# Patient Record
Sex: FEMALE | Race: WHITE | Employment: OTHER
[De-identification: names, ages, dates, MRNs, and addresses within clinical notes are randomized per-mention and may not be internally consistent; named-entity substitution may affect disease eponyms.]

---

## 2017-02-03 PROCEDURE — 87340 HEPATITIS B SURFACE AG IA: CPT | Performed by: INTERNAL MEDICINE

## 2017-02-03 PROCEDURE — 83516 IMMUNOASSAY NONANTIBODY: CPT | Performed by: INTERNAL MEDICINE

## 2017-02-03 PROCEDURE — 86803 HEPATITIS C AB TEST: CPT | Performed by: INTERNAL MEDICINE

## 2017-02-03 PROCEDURE — 84432 ASSAY OF THYROGLOBULIN: CPT | Performed by: INTERNAL MEDICINE

## 2017-03-22 ENCOUNTER — SURGERY (OUTPATIENT)
Age: 67
End: 2017-03-22

## 2017-03-22 ENCOUNTER — HOSPITAL ENCOUNTER (OUTPATIENT)
Facility: HOSPITAL | Age: 67
Setting detail: HOSPITAL OUTPATIENT SURGERY
Discharge: HOME | End: 2017-03-22
Attending: INTERNAL MEDICINE | Admitting: INTERNAL MEDICINE
Payer: COMMERCIAL

## 2017-03-22 VITALS
RESPIRATION RATE: 16 BRPM | HEIGHT: 64 IN | WEIGHT: 145 LBS | TEMPERATURE: 98 F | HEART RATE: 80 BPM | SYSTOLIC BLOOD PRESSURE: 148 MMHG | OXYGEN SATURATION: 97 % | DIASTOLIC BLOOD PRESSURE: 89 MMHG | BODY MASS INDEX: 24.75 KG/M2

## 2017-03-22 DIAGNOSIS — K92.1 BLOOD IN STOOL: ICD-10-CM

## 2017-03-22 DIAGNOSIS — Z86.010 PERSONAL HISTORY OF COLONIC POLYPS: ICD-10-CM

## 2017-03-22 PROCEDURE — 0DJD8ZZ INSPECTION OF LOWER INTESTINAL TRACT, VIA NATURAL OR ARTIFICIAL OPENING ENDOSCOPIC: ICD-10-PCS | Performed by: INTERNAL MEDICINE

## 2017-03-22 PROCEDURE — 0DJ08ZZ INSPECTION OF UPPER INTESTINAL TRACT, VIA NATURAL OR ARTIFICIAL OPENING ENDOSCOPIC: ICD-10-PCS | Performed by: INTERNAL MEDICINE

## 2017-03-22 RX ORDER — SODIUM CHLORIDE, SODIUM LACTATE, POTASSIUM CHLORIDE, CALCIUM CHLORIDE 600; 310; 30; 20 MG/100ML; MG/100ML; MG/100ML; MG/100ML
INJECTION, SOLUTION INTRAVENOUS CONTINUOUS
Status: DISCONTINUED | OUTPATIENT
Start: 2017-03-22 | End: 2017-03-22

## 2017-03-22 RX ORDER — MIDAZOLAM HYDROCHLORIDE 1 MG/ML
INJECTION INTRAMUSCULAR; INTRAVENOUS
Status: DISCONTINUED | OUTPATIENT
Start: 2017-03-22 | End: 2017-03-22

## 2017-03-22 RX ORDER — ONDANSETRON 2 MG/ML
4 INJECTION INTRAMUSCULAR; INTRAVENOUS ONCE AS NEEDED
Status: DISCONTINUED | OUTPATIENT
Start: 2017-03-22 | End: 2017-03-22

## 2017-03-22 NOTE — OPERATIVE REPORT
[address] Patient Status:  Hospital Outpatient Surgery    5/10/1950 MRN [id number]   [facility] ENDOSCOPY Attending Astrid Johnson MD   Hosp Day # 0 PCP Claudean Palmer, MD         PATIENT NAME: Renee Diaz the procedure well. There were no implants placed nor significant blood loss. Total moderate sedation time was 21 minutes. There were no immediate apparent complications. RECOMMENDATIONS   Consume a high fiber diet.   Repeat colonoscopy in 10 years as p

## 2017-03-22 NOTE — H&P
[facility] GASTROENTEROLOGY    REFERRING PHYSICIAN: Dr. Georgia Orozco is a 77year old female.   Dyspepsia, melena, CRC screening    See note reviewed from 3/10/17    PROCEDURE: EGD, colonoscopy    Allergies: Codeine; Erythromycin and alternatives with the patient/family. They understand and agree to proceed with the plan of care. I have reviewed the History and Physical performed. I have examined this patient today and any changes are documented above.      Jaylen Jay MD  3

## 2017-04-13 PROBLEM — K44.9 HIATAL HERNIA: Status: ACTIVE | Noted: 2017-04-13

## 2017-04-13 PROBLEM — R93.1 ELEVATED CORONARY ARTERY CALCIUM SCORE: Status: ACTIVE | Noted: 2017-04-13

## 2017-04-13 PROBLEM — E78.5 HYPERLIPIDEMIA, UNSPECIFIED HYPERLIPIDEMIA TYPE: Status: ACTIVE | Noted: 2017-04-13

## 2017-04-13 PROBLEM — Z92.29 HISTORY OF POSTMENOPAUSAL HRT: Status: ACTIVE | Noted: 2017-04-13

## 2017-05-19 ENCOUNTER — HOSPITAL ENCOUNTER (OUTPATIENT)
Facility: HOSPITAL | Age: 67
Setting detail: HOSPITAL OUTPATIENT SURGERY
Discharge: HOME | End: 2017-05-19
Attending: INTERNAL MEDICINE | Admitting: INTERNAL MEDICINE
Payer: COMMERCIAL

## 2017-05-19 ENCOUNTER — SURGERY (OUTPATIENT)
Age: 67
End: 2017-05-19

## 2017-05-19 VITALS
RESPIRATION RATE: 16 BRPM | TEMPERATURE: 98 F | DIASTOLIC BLOOD PRESSURE: 85 MMHG | HEIGHT: 63 IN | SYSTOLIC BLOOD PRESSURE: 144 MMHG | HEART RATE: 79 BPM | OXYGEN SATURATION: 99 % | WEIGHT: 147 LBS | BODY MASS INDEX: 26.05 KG/M2

## 2017-05-19 DIAGNOSIS — K21.9 GASTROESOPHAGEAL REFLUX DISEASE, ESOPHAGITIS PRESENCE NOT SPECIFIED: ICD-10-CM

## 2017-05-19 PROCEDURE — 4A0B7BZ MEASUREMENT OF GASTROINTESTINAL PRESSURE, VIA NATURAL OR ARTIFICIAL OPENING: ICD-10-PCS | Performed by: INTERNAL MEDICINE

## 2017-05-20 NOTE — H&P
[address] [address]    Hayden Jane is a 79year old female.   Large hiatal hernia, pre op motility study    PROCEDURE: Esophageal motility without sedation    Allergies: Codeine; Erythromycin    Prior to Admission Medications:  predni Comment Repeat 2027, remote polyp history    COLONOSCOPY N/A 3/22/2017    Comment Procedure: COLONOSCOPY;  Surgeon: Edward Quijano MD;  Location: [facility] ENDOSCOPY       Smoking Status: Former Smoker                   Packs/Day: 0.00  Years: 8        Quit victoria

## 2017-05-24 NOTE — OPERATIVE REPORT
Author: Jj Dawson MD Service: Gastroenterology Author Type: Physician     Filed: 5/24/2017 11:36 AM Note Time: 5/19/2017 2:35 PM Status: Signed    : Jj Dawson MD (Physician)      Expand All Collapse All   OPERATIVE REPORT MOTILITY STUDY.

## 2017-05-24 NOTE — OPERATIVE REPORT
OPERATIVE REPORT MOTILITY STUDY.       [facility]  Patient Status: No patient class for patient encounter      5/10/1950  MRN  [id number]    [facility]   Attending  No att. providers found    Hosp Day #  17  PCP Lennox Billing, M

## 2017-06-19 PROBLEM — T45.4X5A IRON ADVERSE REACTION, INITIAL ENCOUNTER: Status: ACTIVE | Noted: 2017-06-19

## 2017-06-19 PROBLEM — D50.0 IRON DEFICIENCY ANEMIA DUE TO CHRONIC BLOOD LOSS: Status: ACTIVE | Noted: 2017-06-19

## 2017-07-27 PROCEDURE — 82607 VITAMIN B-12: CPT | Performed by: INTERNAL MEDICINE

## 2017-07-27 PROCEDURE — 82746 ASSAY OF FOLIC ACID SERUM: CPT | Performed by: INTERNAL MEDICINE

## 2017-10-06 PROBLEM — Z86.2 HISTORY OF IRON DEFICIENCY ANEMIA: Status: ACTIVE | Noted: 2017-10-06

## 2018-02-09 PROBLEM — K21.9 ACID REFLUX: Status: ACTIVE | Noted: 2018-02-09

## 2018-02-09 PROBLEM — R13.19 OTHER DYSPHAGIA: Status: ACTIVE | Noted: 2018-02-09

## 2018-02-09 PROBLEM — Z71.9 HEALTH EDUCATION/COUNSELING: Status: ACTIVE | Noted: 2018-02-09

## 2018-09-28 PROCEDURE — 81003 URINALYSIS AUTO W/O SCOPE: CPT | Performed by: INTERNAL MEDICINE

## 2018-10-05 ENCOUNTER — CHARTING TRANS (OUTPATIENT)
Dept: OTHER | Age: 68
End: 2018-10-05

## 2018-11-27 VITALS — TEMPERATURE: 97.8 F

## 2019-06-21 PROBLEM — I10 ESSENTIAL HYPERTENSION: Status: ACTIVE | Noted: 2019-06-21

## 2019-11-01 ENCOUNTER — APPOINTMENT (OUTPATIENT)
Dept: URGENT CARE | Age: 69
End: 2019-11-01

## 2020-01-28 ENCOUNTER — WALK IN (OUTPATIENT)
Dept: URGENT CARE | Age: 70
End: 2020-01-28

## 2020-01-28 VITALS
WEIGHT: 125 LBS | RESPIRATION RATE: 18 BRPM | DIASTOLIC BLOOD PRESSURE: 80 MMHG | BODY MASS INDEX: 21.34 KG/M2 | HEART RATE: 72 BPM | TEMPERATURE: 98.2 F | HEIGHT: 64 IN | SYSTOLIC BLOOD PRESSURE: 140 MMHG

## 2020-01-28 DIAGNOSIS — H00.014 HORDEOLUM EXTERNUM OF LEFT UPPER EYELID: Primary | ICD-10-CM

## 2020-01-28 PROCEDURE — 99203 OFFICE O/P NEW LOW 30 MIN: CPT | Performed by: NURSE PRACTITIONER

## 2020-01-28 RX ORDER — MENTHOL 5.8 MG/1
1 LOZENGE ORAL
COMMUNITY

## 2020-01-28 RX ORDER — ERYTHROMYCIN 5 MG/G
OINTMENT OPHTHALMIC
Qty: 3.5 G | Refills: 0 | Status: SHIPPED | OUTPATIENT
Start: 2020-01-28

## 2020-01-28 RX ORDER — ZINC OXIDE 13 %
CREAM (GRAM) TOPICAL
COMMUNITY

## 2020-01-28 RX ORDER — FEXOFENADINE HCL 60 MG/1
60 TABLET, FILM COATED ORAL
COMMUNITY

## 2020-01-28 RX ORDER — MULTIVIT WITH MINERALS/LUTEIN
1000 TABLET ORAL
COMMUNITY

## 2020-01-28 RX ORDER — FLUTICASONE PROPIONATE 50 MCG
SPRAY, SUSPENSION (ML) NASAL
COMMUNITY
Start: 2019-10-09

## 2020-01-28 ASSESSMENT — ENCOUNTER SYMPTOMS
EYE ITCHING: 1
CONSTITUTIONAL NEGATIVE: 1
PHOTOPHOBIA: 0
RESPIRATORY NEGATIVE: 1
EYE DISCHARGE: 0
EYE PAIN: 1
EYE REDNESS: 1
GASTROINTESTINAL NEGATIVE: 1
PSYCHIATRIC NEGATIVE: 1
NEUROLOGICAL NEGATIVE: 1

## 2020-01-28 ASSESSMENT — VISUAL ACUITY
OU: 1
OD_CC: 20/30
OS_CC: 20/30

## 2021-03-15 DIAGNOSIS — Z23 NEED FOR VACCINATION: ICD-10-CM

## 2021-03-18 ENCOUNTER — IMMUNIZATION (OUTPATIENT)
Dept: LAB | Age: 71
End: 2021-03-18

## 2021-03-18 DIAGNOSIS — Z23 NEED FOR VACCINATION: Primary | ICD-10-CM

## 2021-03-18 PROCEDURE — 91300 COVID 19 PFIZER-BIONTECH: CPT

## 2021-03-18 PROCEDURE — 0001A COVID 19 PFIZER-BIONTECH: CPT

## 2021-04-13 ENCOUNTER — IMMUNIZATION (OUTPATIENT)
Dept: LAB | Age: 71
End: 2021-04-13
Attending: HOSPITALIST

## 2021-04-13 DIAGNOSIS — Z23 NEED FOR VACCINATION: Primary | ICD-10-CM

## 2021-04-13 PROCEDURE — 91300 COVID 19 PFIZER-BIONTECH: CPT | Performed by: HOSPITALIST

## 2021-04-13 PROCEDURE — 0002A COVID 19 PFIZER-BIONTECH: CPT | Performed by: HOSPITALIST

## (undated) DEVICE — Device: Brand: DEFENDO AIR/WATER/SUCTION AND BIOPSY VALVE

## (undated) NOTE — IP AVS SNAPSHOT
[facility] [place] [address] [place], [address] ~ [phone]                Discharge Summary   3/22/2017    Radha Cuenca           Admission Information        Provider Department    3/22/2017 Mitchell Watkins MD  Endoscopy Patient Instructions:     1. Repeat colonoscopy in 10 years.       Activity: activity as tolerated and no driving for today      Bong Ochoa MD        Home Care Instructions for Colonoscopy and/or Gastroscopy with Sedation    Diet:  - Resume your regula Specialty:  GASTROENTEROLOGY    Why:  As needed    Contact information:    [address] [facility] [phone]        Immunization History as of 3/22/2017  Never Reviewed    INFLUENZA 10/1/2016, 11/21/2015, 9/11/2014, 10/11/20 you to explore options for quitting.     - If you have concerns related to behavioral health issues or thoughts of harming yourself, contact [address] [place] [address] at [phone].     - If you don’t have insurance, Omer Sullivan

## (undated) NOTE — IP AVS SNAPSHOT
[facility] [place] [address] [place], [address] ~ [phone]                Discharge Summary   5/19/2017    Mayo Beans           Admission Information        Provider Department    5/19/2017 Radha Jones MD  Endoscopy [    ]    [    ]    [    ]    [    ]               Discharge References/Attachments     MANOMETRY, ESOPHAGEAL (ENGLISH)      Instructions and Information about Your Health     None      Follow-up Information     Call Austin Restrepo MD.    Specialty:  [place] - If you are a smoker or have smoked in the last 12 months, we encourage you to explore options for quitting.     - If you have concerns related to behavioral health issues or thoughts of harming yourself, contact [address] [facility] a